# Patient Record
Sex: MALE | Race: OTHER | HISPANIC OR LATINO
[De-identification: names, ages, dates, MRNs, and addresses within clinical notes are randomized per-mention and may not be internally consistent; named-entity substitution may affect disease eponyms.]

---

## 2023-12-19 ENCOUNTER — APPOINTMENT (OUTPATIENT)
Dept: ORTHOPEDIC SURGERY | Facility: CLINIC | Age: 33
End: 2023-12-19
Payer: MEDICAID

## 2023-12-19 DIAGNOSIS — M25.562 PAIN IN LEFT KNEE: ICD-10-CM

## 2023-12-19 PROBLEM — Z00.00 ENCOUNTER FOR PREVENTIVE HEALTH EXAMINATION: Status: ACTIVE | Noted: 2023-12-19

## 2023-12-19 PROCEDURE — 73562 X-RAY EXAM OF KNEE 3: CPT | Mod: LT

## 2023-12-19 PROCEDURE — 99203 OFFICE O/P NEW LOW 30 MIN: CPT

## 2023-12-19 NOTE — IMAGING
[de-identified] : Physical exam of the left knee: Mild effusion.  No erythema or ecchymosis.  Good range of motion.  Tenderness to palpation over the lateral joint line.  No medial joint line tenderness to palpation.  No tenderness to palpation over the collateral ligaments or the anterior aspect of the knee.  Stable to varus and valgus stress testing.  Positive Magan's testing laterally.  Intact to light touch, nonantalgic gait.

## 2023-12-19 NOTE — DISCUSSION/SUMMARY
[de-identified] : I reviewed the x-ray findings with the patient.  He will start formal therapy, Rx provided for that.  He may continue taking Advil on as-needed basis for pain.  I recommend an MRI of the left knee to evaluate for a loose body causing mechanical symptoms of catching in his left knee.  The MRI is being ordered for potential surgical planning for removal of loose body.  He will call me 2 to 3 days after the MRI is performed so we can discuss the results, I will see him in 6 weeks for further evaluation.

## 2023-12-19 NOTE — DATA REVIEWED
[Left] : left [Knee] : knee [FreeTextEntry1] : 3 views of the left knee were obtained here in the office today and show: Well-preserved medial and lateral compartments, small bone spur noted over the lateral femoral condyle.  There appears to be a loose bone chip by the intercondylar notch.  There is evidence of an old proximal fibular fracture.

## 2023-12-19 NOTE — HISTORY OF PRESENT ILLNESS
[de-identified] : The patient is a 33-year-old male here for evaluation of his left knee.  Has been having pain in his left knee since 2009 when he sustained a proximal fibular fracture.  He also states that he refractured the same area 3 to 4 years ago.  He was seen by a physician for this but never had any treatment other than an x-ray.  He notices swelling in the knee.  He takes Advil which provides him with some relief.  He also reports that his knee is catching.  He feels like it is buckling.  He does point laterally as to where the pain is.

## 2023-12-30 ENCOUNTER — APPOINTMENT (OUTPATIENT)
Dept: MRI IMAGING | Facility: CLINIC | Age: 33
End: 2023-12-30
Payer: MEDICAID

## 2023-12-30 PROCEDURE — 73721 MRI JNT OF LWR EXTRE W/O DYE: CPT | Mod: 1L,LT

## 2024-01-30 ENCOUNTER — APPOINTMENT (OUTPATIENT)
Dept: ORTHOPEDIC SURGERY | Facility: CLINIC | Age: 34
End: 2024-01-30

## 2024-02-08 ENCOUNTER — APPOINTMENT (OUTPATIENT)
Dept: ORTHOPEDIC SURGERY | Facility: CLINIC | Age: 34
End: 2024-02-08
Payer: MEDICAID

## 2024-02-08 VITALS — HEIGHT: 71 IN | WEIGHT: 220 LBS | BODY MASS INDEX: 30.8 KG/M2

## 2024-02-08 DIAGNOSIS — S83.272D COMPLEX TEAR OF LATERAL MENISCUS, CURRENT INJURY, LEFT KNEE, SUBSEQUENT ENCOUNTER: ICD-10-CM

## 2024-02-08 PROCEDURE — 99214 OFFICE O/P EST MOD 30 MIN: CPT

## 2024-02-08 NOTE — HISTORY OF PRESENT ILLNESS
[de-identified] : Patient is here for follow-up on his left knee MRI showing ACL tear lateral meniscal tears and chondromalacia lateral compartment, he works at the [employer] doing mostly computer work also has a history of anxiety he is currently taking no medicines but does use inhaler  Physical exam he is very guarded unable to obtain good ligament exam but he is slightly tender laterally there is no effusion, range of motion is limited to flexion to0- 120 degrees  Impression is lateral meniscal tear chondromalacia lateral compartment and ACL tear  Has had no treatment nonoperative and I will start him on some therapy he will do no basketball sports he does not have a significant amount of pain to prescribe anti-inflammatories, his chief complaint is difficulty doing squats may be candidate for staged procedure if he fails nonoperative treatment,  he had difficulty with an ACL reconstruction due to his anxiety

## 2024-04-11 ENCOUNTER — APPOINTMENT (OUTPATIENT)
Dept: ORTHOPEDIC SURGERY | Facility: CLINIC | Age: 34
End: 2024-04-11